# Patient Record
Sex: FEMALE | URBAN - METROPOLITAN AREA
[De-identification: names, ages, dates, MRNs, and addresses within clinical notes are randomized per-mention and may not be internally consistent; named-entity substitution may affect disease eponyms.]

---

## 2023-04-12 ENCOUNTER — APPOINTMENT (OUTPATIENT)
Dept: URBAN - METROPOLITAN AREA CLINIC 274 | Age: 58
Setting detail: DERMATOLOGY
End: 2023-04-12

## 2023-04-12 VITALS — HEIGHT: 65 IN | WEIGHT: 164 LBS

## 2023-04-12 DIAGNOSIS — L81.4 OTHER MELANIN HYPERPIGMENTATION: ICD-10-CM

## 2023-04-12 DIAGNOSIS — L57.0 ACTINIC KERATOSIS: ICD-10-CM

## 2023-04-12 DIAGNOSIS — D49.2 NEOPLASM OF UNSPECIFIED BEHAVIOR OF BONE, SOFT TISSUE, AND SKIN: ICD-10-CM

## 2023-04-12 DIAGNOSIS — D18.0 HEMANGIOMA: ICD-10-CM

## 2023-04-12 PROBLEM — D18.01 HEMANGIOMA OF SKIN AND SUBCUTANEOUS TISSUE: Status: ACTIVE | Noted: 2023-04-12

## 2023-04-12 PROCEDURE — OTHER MIPS QUALITY: OTHER

## 2023-04-12 PROCEDURE — OTHER PHOTO-DOCUMENTATION: OTHER

## 2023-04-12 PROCEDURE — OTHER COUNSELING: OTHER

## 2023-04-12 PROCEDURE — 99203 OFFICE O/P NEW LOW 30 MIN: CPT | Mod: 25

## 2023-04-12 PROCEDURE — OTHER LIQUID NITROGEN: OTHER

## 2023-04-12 PROCEDURE — 17000 DESTRUCT PREMALG LESION: CPT | Mod: 59

## 2023-04-12 PROCEDURE — OTHER BIOPSY BY SHAVE METHOD: OTHER

## 2023-04-12 PROCEDURE — 11102 TANGNTL BX SKIN SINGLE LES: CPT

## 2023-04-12 ASSESSMENT — PAIN INTENSITY VAS: HOW INTENSE IS YOUR PAIN 0 BEING NO PAIN, 10 BEING THE MOST SEVERE PAIN POSSIBLE?: NO PAIN

## 2023-04-12 ASSESSMENT — LOCATION DETAILED DESCRIPTION DERM
LOCATION DETAILED: EPIGASTRIC SKIN
LOCATION DETAILED: POSTERIOR MID-PARIETAL SCALP
LOCATION DETAILED: LEFT SUPERIOR UPPER BACK
LOCATION DETAILED: RIGHT ANTERIOR PROXIMAL UPPER ARM

## 2023-04-12 ASSESSMENT — LOCATION SIMPLE DESCRIPTION DERM
LOCATION SIMPLE: ABDOMEN
LOCATION SIMPLE: RIGHT UPPER ARM
LOCATION SIMPLE: LEFT UPPER BACK
LOCATION SIMPLE: POSTERIOR SCALP

## 2023-04-12 ASSESSMENT — LOCATION ZONE DERM
LOCATION ZONE: ARM
LOCATION ZONE: SCALP
LOCATION ZONE: TRUNK

## 2023-04-12 NOTE — PROCEDURE: LIQUID NITROGEN
Show Applicator Variable?: Yes
Consent: The patient's consent was obtained including but not limited to risks of crusting, scabbing, blistering, scarring, darker or lighter pigmentary change, recurrence, incomplete removal and infection.
Detail Level: Detailed
Number Of Freeze-Thaw Cycles: 1 freeze-thaw cycle
Render Note In Bullet Format When Appropriate: No
Post-Care Instructions: I reviewed with the patient in detail post-care instructions. Patient is to wear sunprotection, and avoid picking at any of the treated lesions. Pt may apply Vaseline to crusted or scabbing areas.
Duration Of Freeze Thaw-Cycle (Seconds): 5

## 2023-04-12 NOTE — PROCEDURE: BIOPSY BY SHAVE METHOD
Type Of Destruction Used: Curettage
Dressing: bandage
Information: Selecting Yes will display possible errors in your note based on the variables you have selected. This validation is only offered as a suggestion for you. PLEASE NOTE THAT THE VALIDATION TEXT WILL BE REMOVED WHEN YOU FINALIZE YOUR NOTE. IF YOU WANT TO FAX A PRELIMINARY NOTE YOU WILL NEED TO TOGGLE THIS TO 'NO' IF YOU DO NOT WANT IT IN YOUR FAXED NOTE.
Electrodesiccation And Curettage Text: The wound bed was treated with electrodesiccation and curettage after the biopsy was performed.
Anesthesia Type: 1% lidocaine with epinephrine
Hide Second Anesthesia?: No
Detail Level: Detailed
Wound Care: Petrolatum
Cryotherapy Text: The wound bed was treated with cryotherapy after the biopsy was performed.
Notification Instructions: Patient will be notified of biopsy results. However, patient instructed to call the office if not contacted within 2 weeks.
X Size Of Lesion In Cm: 0
Depth Of Biopsy: dermis
Electrodesiccation Text: The wound bed was treated with electrodesiccation after the biopsy was performed.
Consent: Written consent was obtained and risks were reviewed including but not limited to scarring, infection, bleeding, scabbing, incomplete removal, nerve damage and allergy to anesthesia.
Biopsy Type: H and E
Hemostasis: Electrocautery
Billing Type: Third-Party Bill
Was A Bandage Applied: Yes
Anesthesia Volume In Cc (Will Not Render If 0): 1
Biopsy Method: Dermablade
Post-Care Instructions: I reviewed with the patient in detail post-care instructions. Patient is to keep the biopsy site dry overnight, and then apply bacitracin twice daily until healed. Patient may apply hydrogen peroxide soaks to remove any crusting.
Curettage Text: The wound bed was treated with curettage after the biopsy was performed.
Size Of Lesion In Cm: 0.4
Silver Nitrate Text: The wound bed was treated with silver nitrate after the biopsy was performed.

## 2023-05-22 ENCOUNTER — APPOINTMENT (OUTPATIENT)
Dept: URBAN - METROPOLITAN AREA CLINIC 307 | Age: 58
Setting detail: DERMATOLOGY
End: 2023-05-22

## 2023-05-22 DIAGNOSIS — L57.0 ACTINIC KERATOSIS: ICD-10-CM

## 2023-05-22 PROCEDURE — OTHER LIQUID NITROGEN: OTHER

## 2023-05-22 PROCEDURE — OTHER PATHOLOGY DISCUSSION: OTHER

## 2023-05-22 PROCEDURE — 17000 DESTRUCT PREMALG LESION: CPT

## 2023-05-22 PROCEDURE — OTHER OTHER: OTHER

## 2023-05-22 PROCEDURE — OTHER COUNSELING: OTHER

## 2023-05-22 ASSESSMENT — LOCATION SIMPLE DESCRIPTION DERM: LOCATION SIMPLE: POSTERIOR SCALP

## 2023-05-22 ASSESSMENT — LOCATION ZONE DERM: LOCATION ZONE: SCALP

## 2023-05-22 ASSESSMENT — LOCATION DETAILED DESCRIPTION DERM: LOCATION DETAILED: POSTERIOR MID-PARIETAL SCALP

## 2023-05-22 NOTE — PROCEDURE: OTHER
Render Risk Assessment In Note?: no
Patient Management Risk Assessment: Minimal
Other (Free Text): Discussed UV protective hats.
Detail Level: Zone
Note Text (......Xxx Chief Complaint.): This diagnosis correlates with the

## 2023-05-22 NOTE — HPI: SKIN LESION
What Type Of Note Output Would You Prefer (Optional)?: Bullet Format
How Severe Is Your Skin Lesion?: mild
Has Your Skin Lesion Been Treated?: not been treated
Is This A New Presentation, Or A Follow-Up?: Skin Lesion
Additional History: Pt is here to get an AK from biopsy results sprayed with LN2.

## 2023-06-27 ENCOUNTER — APPOINTMENT (OUTPATIENT)
Dept: URBAN - METROPOLITAN AREA CLINIC 307 | Age: 58
Setting detail: DERMATOLOGY
End: 2023-06-28

## 2023-06-27 DIAGNOSIS — L30.9 DERMATITIS, UNSPECIFIED: ICD-10-CM

## 2023-06-27 DIAGNOSIS — L29.8 OTHER PRURITUS: ICD-10-CM

## 2023-06-27 PROCEDURE — OTHER COUNSELING: OTHER

## 2023-06-27 PROCEDURE — OTHER INTRAMUSCULAR KENALOG: OTHER

## 2023-06-27 PROCEDURE — 99212 OFFICE O/P EST SF 10 MIN: CPT | Mod: 25

## 2023-06-27 PROCEDURE — OTHER PRESCRIPTION: OTHER

## 2023-06-27 PROCEDURE — OTHER MIPS QUALITY: OTHER

## 2023-06-27 PROCEDURE — OTHER MEDICATION COUNSELING: OTHER

## 2023-06-27 PROCEDURE — 96372 THER/PROPH/DIAG INJ SC/IM: CPT

## 2023-06-27 RX ORDER — PREDNISONE 10 MG/1
TABLET ORAL
Qty: 41 | Refills: 0 | Status: ERX | COMMUNITY
Start: 2023-06-27

## 2023-06-27 RX ORDER — MOMETASONE FUROATE 1 MG/G
CREAM TOPICAL
Qty: 45 | Refills: 1 | Status: ERX | COMMUNITY
Start: 2023-06-27

## 2023-06-27 ASSESSMENT — LOCATION SIMPLE DESCRIPTION DERM
LOCATION SIMPLE: LEFT CHEEK
LOCATION SIMPLE: SUPERIOR FOREHEAD
LOCATION SIMPLE: CHIN
LOCATION SIMPLE: RIGHT CHEEK
LOCATION SIMPLE: NOSE
LOCATION SIMPLE: LEFT DELTOID

## 2023-06-27 ASSESSMENT — LOCATION ZONE DERM
LOCATION ZONE: SHOULDER
LOCATION ZONE: FACE
LOCATION ZONE: NOSE

## 2023-06-27 ASSESSMENT — LOCATION DETAILED DESCRIPTION DERM
LOCATION DETAILED: LEFT CENTRAL MALAR CHEEK
LOCATION DETAILED: RIGHT CHIN
LOCATION DETAILED: RIGHT INFERIOR LATERAL MALAR CHEEK
LOCATION DETAILED: LEFT DELTOID
LOCATION DETAILED: SUPERIOR MID FOREHEAD
LOCATION DETAILED: NASAL SUPRATIP

## 2023-06-27 ASSESSMENT — SEVERITY ASSESSMENT: SEVERITY: SEVERE

## 2023-06-27 ASSESSMENT — PAIN INTENSITY VAS: HOW INTENSE IS YOUR PAIN 0 BEING NO PAIN, 10 BEING THE MOST SEVERE PAIN POSSIBLE?: NO PAIN

## 2023-06-27 ASSESSMENT — BSA RASH: BSA RASH: 12

## 2024-02-01 NOTE — PROCEDURE: MEDICATION COUNSELING
This note was created by combination of typed  and M-Modal dictation.  Transcription errors may be present.  If there are any questions, please contact me.    Assessment and Plan:   Assessment and Plan    Normal physical exam  Screening for colon cancer 08/17/2015 colonoscopy diverticulosis entire colon.  Grade 1 internal hemorrhoids.  Asymptomatic menopausal state  Thalassemia, unspecified type  -pre visit labs reviewed.  Colonoscopy due 2025  DEXA scan ordered.  We briefly discussed bisphosphonate therapy if she has osteoporosis.  Take calcium 1200 mg daily supplement.  Vitamin-D take 2000 IU daily  -     DXA Bone Density Axial Skeleton 1 or more sites; Future; Expected date: 02/01/2024    Essential hypertension  -BP stable.  Pre visit labs potassium stable.  No changes  -     atenoloL-chlorthalidone (TENORETIC) 50-25 mg Tab; Take 1 tablet by mouth every morning.  Dispense: 90 tablet; Refill: 3  -     potassium chloride (KLOR-CON) 10 MEQ TbSR; Take 1 tablet (10 mEq total) by mouth once daily.  Dispense: 90 tablet; Refill: 3    Stage 3a chronic kidney disease  -CKD stage 2 on last couple of labs.  PTH and vitamin-D were abnormal.  Start calcium and vitamin-D as above.  Check surveillance labs  -     Comprehensive Metabolic Panel; Future; Expected date: 07/31/2024  -     Lipid Panel; Future; Expected date: 07/31/2024  -     Phosphorus; Future; Expected date: 07/31/2024  -     Vitamin D; Future; Expected date: 07/31/2024  -     PTH, Intact; Future; Expected date: 07/31/2024  -     CBC Without Differential; Future; Expected date: 07/31/2024    Dyslipidemia  -pre visit labs lipid high not taking statin regularly, denies side effects, restart low-dose.  -     Comprehensive Metabolic Panel; Future; Expected date: 07/31/2024  -     Lipid Panel; Future; Expected date: 07/31/2024  -     CBC Without Differential; Future; Expected date: 07/31/2024  -     atorvastatin (LIPITOR) 10 MG tablet; Take 1 tablet (10 mg  total) by mouth once daily.  Dispense: 90 tablet; Refill: 3       Declines vaccines    Medications Discontinued During This Encounter   Medication Reason    potassium chloride (KLOR-CON) 10 MEQ TbSR Reorder    atenoloL-chlorthalidone (TENORETIC) 50-25 mg Tab Reorder    atorvastatin (LIPITOR) 10 MG tablet Reorder       meds sent this encounter:  Medications Ordered This Encounter   Medications    atenoloL-chlorthalidone (TENORETIC) 50-25 mg Tab     Sig: Take 1 tablet by mouth every morning.     Dispense:  90 tablet     Refill:  3     Pharmacy update refills, keep on file, not requesting Rx to be filled today.    atorvastatin (LIPITOR) 10 MG tablet     Sig: Take 1 tablet (10 mg total) by mouth once daily.     Dispense:  90 tablet     Refill:  3     Pharmacy update refills, keep on file, not requesting Rx to be filled today.    potassium chloride (KLOR-CON) 10 MEQ TbSR     Sig: Take 1 tablet (10 mEq total) by mouth once daily.     Dispense:  90 tablet     Refill:  3     Pharmacy update refills, keep on file, not requesting Rx to be filled today.         Follow Up:  Follow-up 6 months with pre visit labs  Future Appointments   Date Time Provider Department Center   2024  1:40 PM Raul Adkins MD [facility]   2024  1:00 PM [facility]         Subjective:   Subjective   Chief Complaint   Patient presents with    Annual Exam       HPI  Yahaira is a 72 y.o. female.    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 Patient is postmenopausal.    Last appointment with this clinic was Visit date not found. Last visit with me 8/1/2023   To summarize last visit and events leading up to today:  Hypertension, CKD stage IIIA, hypokalemia, on K supplement   Hyperlipidemia/statin  Osteopenia.  Needs to update DEXA scan but she was wary at that time b/c of concerns about polypharmacy if dx'd with osteoporosis.  If bisphosphonate indicated, her creatinine can support it. Ca and vit D supplement.   Post menopause with dryness, not taking topical estrogen.  Using OTC black cohosh, advised to stop    Pre visit labs, CBC microcytosis known thalassemia  Iron profile normal   CMP normal normal creatinine   Lipid profile lipid high   Vitamin-D low 22 from 34  PTH mild high    Today's visit:  She is here accompanied by her .  History from the patient   Not taking statin regularly.  Has heard about SE but has not experienced any SE  She would be agreeable to take it regularly.  Never took vitamin supplements.  Reviewed her pre visit labs.  PTH is high which is new.  Vitamin-D is low which is new.  Recommended calcium and vitamin-D.  She does not have high intake of dietary calcium.    Needs more physical activity.  Motivation is the barrier.  Would walk more.    Feels like she has adequate fruits and vegetable intake.    Patient Care Team:  Raul Adkins MD as PCP - General (Internal Medicine)  Temi Ziegler MD as Consulting Physician (Internal Medicine)  Doroteo Beaver MD as Consulting Physician (Ophthalmology)  Jeremias Ca MD as Consulting Physician (Gastroenterology)  Georgie Ralph MD as Obstetrician (Obstetrics and Gynecology)  Veronica Baker MA as Care Coordinator  [facility] (Gastroenterology)      Patient Active Problem List    Diagnosis Date Noted    Squamous blepharitis  of upper and lower eyelids of both eyes 12/18/2023    Osteopenia of multiple sites on DEXa 2015 07/28/2022    GERD with esophagitis on EGD 8/2015 01/16/2020 08/17/2015 EGD irregular Z-line biopsies of duodenum and stomach and GE junction obtained.  Biopsy showed mild chronic esophagitis.  Mild chronic gastritis.  H pylori negative.  Duodenum normal.      Screening for colon cancer 08/17/2015 colonoscopy diverticulosis entire colon.  Grade 1 internal hemorrhoids. 01/16/2020 08/17/2015 colonoscopy diverticulosis entire colon.  Grade 1 internal hemorrhoids.      Stage 3a chronic kidney disease 12/03/2019    Pure hypercholesterolemia 05/29/2019    Essential hypertension 05/29/2019    Status post cholecystectomy 1998 still gets bile sludge in the bile duct; Dr. 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  Medication List with Changes/Refills   Current Medications    ATENOLOL-CHLORTHALIDONE (TENORETIC) 50-25 MG TAB    Take 1 tablet by mouth every morning.    ATORVASTATIN (LIPITOR) 10 MG TABLET    TAKE 1 TABLET(10 MG) BY MOUTH EVERY DAY    BRIMONIDINE 0.1% (ALPHAGAN P) 0.1 % DROP    Place 1 drop into both eyes 3 (three) times daily.    DORZOLAMIDE-TIMOLOL 2-0.5% (COSOPT) 22.3-6.8 MG/ML OPHTHALMIC SOLUTION    INSTILL 1 DROP IN BOTH EYES THREE TIMES DAILY    FLUOROMETHOLONE 0.1% (FML) 0.1 % DRPS    SHAKE LIQUID AND INSTILL 1 DROP IN BOTH EYES EVERY DAY    FLUTICASONE PROPIONATE  "(FLONASE) 50 MCG/ACTUATION NASAL SPRAY    by Each Nostril route.    LATANOPROST 0.005 % OPHTHALMIC SOLUTION    INSTILL 1 DROP IN BOTH EYES EVERY EVENING    LUTEIN ORAL    Take 1 tablet by mouth every morning.    POTASSIUM CHLORIDE (KLOR-CON) 10 MEQ TBSR    Take 1 tablet (10 mEq total) by mouth once daily.         Objective:   Objective   Physical Exam   Vitals:    02/01/24 1007   BP: 122/68   Pulse: 72   Temp: 98.7 °F (37.1 °C)   SpO2: 97%   Weight: 67.1 kg (147 lb 14.9 oz)   Height: 5' 2" (1.575 m)    Body mass index is 27.06 kg/m².            Physical Exam  Constitutional:       Appearance: She is well-developed.   HENT:      Right Ear: Tympanic membrane, ear canal and external ear normal.      Left Ear: Tympanic membrane, ear canal and external ear normal.   Eyes:      General: No scleral icterus.     Extraocular Movements: Extraocular movements intact.      Comments: Left eye prosthesis   Cardiovascular:      Rate and Rhythm: Normal rate and regular rhythm.      Heart sounds: Normal heart sounds. No murmur heard.  Pulmonary:      Effort: Pulmonary effort is normal.      Breath sounds: Normal breath sounds. No wheezing.   Abdominal:      Palpations: Abdomen is soft. There is no hepatomegaly, splenomegaly or mass.      Tenderness: There is no abdominal tenderness.   Musculoskeletal:         General: No deformity. Normal range of motion.      Cervical back: Neck supple.      Right lower leg: No edema.      Left lower leg: No edema.   Lymphadenopathy:      Cervical: No cervical adenopathy.   Skin:     General: Skin is warm and dry.      Findings: No rash.      Comments: On exposed skin   Neurological:      Mental Status: She is alert and oriented to person, place, and time.      Deep Tendon Reflexes: Reflexes are normal and symmetric.   Psychiatric:         Behavior: Behavior normal.         Thought Content: Thought content normal.         Judgment: Judgment normal.         Component      Latest Ref Rng 7/24/2023 " 1/25/2024   WBC      3.90 - 12.70 K/uL 5.23  5.55    RBC      4.00 - 5.40 M/uL 5.27  5.04    Hemoglobin      12.0 - 16.0 g/dL 10.9 (L)  10.6 (L)    Hematocrit      37.0 - 48.5 % 37.6  35.5 (L)    MCV      82 - 98 fL 71 (L)  70 (L)    MCH      27.0 - 31.0 pg 20.7 (L)  21.0 (L)    MCHC      32.0 - 36.0 g/dL 29.0 (L)  29.9 (L)    RDW      11.5 - 14.5 % 16.0 (H)  17.0 (H)    Platelet Count      150 - 450 K/uL 221  222    MPV      9.2 - 12.9 fL 10.0  10.1    Immature Granulocytes      0.0 - 0.5 % 0.8 (H)     Gran # (ANC)      1.8 - 7.7 K/uL 3.0     Immature Grans (Abs)      0.00 - 0.04 K/uL 0.04     Lymph #      1.0 - 4.8 K/uL 1.5     Mono #      0.3 - 1.0 K/uL 0.4     Eos #      0.0 - 0.5 K/uL 0.2     Baso #      0.00 - 0.20 K/uL 0.06     nRBC      0 /100 WBC 0     Gran %      38.0 - 73.0 % 58.1     Lymph %      18.0 - 48.0 % 28.3     Mono %      4.0 - 15.0 % 7.1     Eosinophil %      0.0 - 8.0 % 4.6     Basophil %      0.0 - 1.9 % 1.1     Differential Method Automated     Sodium      136 - 145 mmol/L 143  140    Potassium      3.5 - 5.1 mmol/L 3.9  3.5    Chloride      95 - 110 mmol/L 104  103    CO2      23 - 29 mmol/L 28  27    Glucose      70 - 110 mg/dL 100  89    BUN      8 - 23 mg/dL 20  21    Creatinine      0.5 - 1.4 mg/dL 1.0  0.9    Calcium      8.7 - 10.5 mg/dL 10.3  10.1    PROTEIN TOTAL      6.0 - 8.4 g/dL 7.5  7.6    Albumin      3.5 - 5.2 g/dL 4.1  4.0    Albumin      3.5 - 5.2 g/dL  4.0    BILIRUBIN TOTAL      0.1 - 1.0 mg/dL 0.7  0.5    ALP      55 - 135 U/L 116  115    AST      10 - 40 U/L 33  30    ALT      10 - 44 U/L 45 (H)  41    eGFR      >60 mL/min/1.73 m^2 >60.0  >60.0    Anion Gap      8 - 16 mmol/L 11  10    Phosphorus Level      2.7 - 4.5 mg/dL 3.3  3.1    Bilirubin Direct      0.1 - 0.3 mg/dL  0.2    Cholesterol Total      120 - 199 mg/dL  263 (H)    Triglycerides      30 - 150 mg/dL  132    HDL      40 - 75 mg/dL  74    LDL Cholesterol      63.0 - 159.0 mg/dL  162.6 (H)    HDL/Cholesterol  Ratio      20.0 - 50.0 %  28.1    Total Cholesterol/HDL Ratio      2.0 - 5.0   3.6    Non-HDL Cholesterol      mg/dL  189    Iron      30 - 160 ug/dL  80    Transferrin      200 - 375 mg/dL  259    TIBC      250 - 450 ug/dL  383    Saturated Iron      20 - 50 %  21    Vitamin D      30 - 96 ng/mL 34  22 (L)    PTH      9.0 - 77.0 pg/mL 66.5  81.6 (H)    Ferritin      20.0 - 300.0 ng/mL  298       Legend:  (L) Low  (H) High        Cantharidin Counseling:  I discussed with the patient the risks of Cantharidin including but not limited to pain, redness, burning, itching, and blistering.

## 2024-05-03 NOTE — HPI: RASH
Nona Kelley     Patient presents for annual exam.  Patient planes of hot flashes for 1 month.  No problems with her urinary function.  Has chronic constipation.  Had a colonoscopy a month ago, and was told has colon cancer.  Will not need any surgical intervention or adjuvant therapy.  Will need a colonoscopy every 6 months.  No personal or family history of VTE.  Does not know what her cholesterol level is.  She is a smoker.  No change in her sexual partner since her last visit.  No abnormal Paps since her hysterectomy.  Has never had a mammogram.    Lab Results   Component Value Date    WBC 8.7 04/05/2024    HGB 13.8 04/05/2024    HCT 41.3 04/05/2024    MCV 97.2 04/05/2024     04/05/2024      Lab Results   Component Value Date     04/05/2024    K 4.6 04/05/2024     04/05/2024    CO2 26 04/05/2024    BUN 8 04/05/2024    CREATININE 0.7 04/05/2024    GLUCOSE 73 (L) 04/05/2024    CALCIUM 9.4 04/05/2024    BILITOT <0.2 04/05/2024    ALKPHOS 88 04/05/2024    AST 19 04/05/2024    ALT 16 04/05/2024    LABGLOM >90 04/05/2024    GFRAA >60 07/09/2022          Past Medical History:   Diagnosis Date    Bee allergy status     Hypothyroidism         Past Surgical History:   Procedure Laterality Date    HYSTERECTOMY, TOTAL ABDOMINAL (CERVIX REMOVED)      LEEP          Family History   Problem Relation Age of Onset    Diabetes Father     Heart Attack Father     Heart Attack Paternal Uncle     Diabetes Paternal Grandmother           Current Outpatient Medications:     famotidine (PEPCID) 20 MG tablet, Take 1 tablet by mouth 2 times daily, Disp: 60 tablet, Rfl: 0    ondansetron (ZOFRAN-ODT) 4 MG disintegrating tablet, Take 1 tablet by mouth 3 times daily as needed for Nausea or Vomiting, Disp: 21 tablet, Rfl: 0    albuterol sulfate HFA (VENTOLIN HFA) 108 (90 Base) MCG/ACT inhaler, Inhale 2 puffs into the lungs 4 times daily as needed for Wheezing, Disp: 54 g, Rfl: 1    fexofenadine (ALLEGRA ALLERGY) 180 MG 
What Type Of Note Output Would You Prefer (Optional)?: Bullet Format
How Severe Is Your Rash?: mild
Is This A New Presentation, Or A Follow-Up?: Rash
Additional History: Pt states I noticed it started a couple of weeks ago and I have tried some otc stuff like soothing moisturizer, cortisone, and some cream which helped a little bit but then Saturday morning it started itching* again then Sunday my face was swollen and bright red.

## 2024-11-13 NOTE — PROCEDURE: MEDICATION COUNSELING
awake/alert/oriented to person, place, time/situation Quinolones Counseling:  I discussed with the patient the risks of fluoroquinolones including but not limited to GI upset, allergic reaction, drug rash, diarrhea, dizziness, photosensitivity, yeast infections, liver function test abnormalities, tendonitis/tendon rupture.